# Patient Record
Sex: MALE | Race: WHITE | URBAN - METROPOLITAN AREA
[De-identification: names, ages, dates, MRNs, and addresses within clinical notes are randomized per-mention and may not be internally consistent; named-entity substitution may affect disease eponyms.]

---

## 2019-06-04 ENCOUNTER — HOSPITAL ENCOUNTER (OUTPATIENT)
Dept: CT IMAGING | Facility: CLINIC | Age: 54
Discharge: HOME | End: 2019-06-04
Attending: COLON & RECTAL SURGERY | Admitting: COLON & RECTAL SURGERY
Payer: COMMERCIAL

## 2019-06-04 DIAGNOSIS — K51.90 ULCERATIVE COLITIS WITHOUT COMPLICATIONS (H): ICD-10-CM

## 2019-06-04 DIAGNOSIS — K61.4 INTRASPHINCTERIC ABSCESS: ICD-10-CM

## 2019-06-04 PROCEDURE — 25000125 ZZHC RX 250: Performed by: COLON & RECTAL SURGERY

## 2019-06-04 PROCEDURE — 25000128 H RX IP 250 OP 636: Performed by: COLON & RECTAL SURGERY

## 2019-06-04 PROCEDURE — 72193 CT PELVIS W/DYE: CPT

## 2019-06-04 RX ORDER — BALSALAZIDE DISODIUM 750 MG/1
2250 CAPSULE ORAL 3 TIMES DAILY
COMMUNITY

## 2019-06-04 RX ORDER — FINASTERIDE 1 MG/1
1 TABLET, FILM COATED ORAL DAILY
COMMUNITY

## 2019-06-04 RX ORDER — IOPAMIDOL 755 MG/ML
106 INJECTION, SOLUTION INTRAVASCULAR ONCE
Status: COMPLETED | OUTPATIENT
Start: 2019-06-04 | End: 2019-06-04

## 2019-06-04 RX ADMIN — IOPAMIDOL 106 ML: 755 INJECTION, SOLUTION INTRAVENOUS at 15:20

## 2019-06-04 RX ADMIN — SODIUM CHLORIDE 70 ML: 9 INJECTION, SOLUTION INTRAVENOUS at 15:26

## 2019-06-05 ENCOUNTER — ANESTHESIA EVENT (OUTPATIENT)
Dept: SURGERY | Facility: CLINIC | Age: 54
End: 2019-06-05
Payer: COMMERCIAL

## 2019-06-05 ENCOUNTER — ANESTHESIA (OUTPATIENT)
Dept: SURGERY | Facility: CLINIC | Age: 54
End: 2019-06-05
Payer: COMMERCIAL

## 2019-06-05 ENCOUNTER — HOSPITAL ENCOUNTER (OUTPATIENT)
Facility: CLINIC | Age: 54
Discharge: HOME | End: 2019-06-05
Attending: COLON & RECTAL SURGERY | Admitting: COLON & RECTAL SURGERY
Payer: COMMERCIAL

## 2019-06-05 VITALS
DIASTOLIC BLOOD PRESSURE: 72 MMHG | HEIGHT: 76 IN | WEIGHT: 203 LBS | RESPIRATION RATE: 16 BRPM | HEART RATE: 50 BPM | OXYGEN SATURATION: 96 % | TEMPERATURE: 96.4 F | BODY MASS INDEX: 24.72 KG/M2 | SYSTOLIC BLOOD PRESSURE: 112 MMHG

## 2019-06-05 DIAGNOSIS — K61.0 PERIANAL ABSCESS: Primary | ICD-10-CM

## 2019-06-05 PROCEDURE — 40000170 ZZH STATISTIC PRE-PROCEDURE ASSESSMENT II: Performed by: COLON & RECTAL SURGERY

## 2019-06-05 PROCEDURE — 37000009 ZZH ANESTHESIA TECHNICAL FEE, EACH ADDTL 15 MIN: Performed by: COLON & RECTAL SURGERY

## 2019-06-05 PROCEDURE — 25000128 H RX IP 250 OP 636: Performed by: ANESTHESIOLOGY

## 2019-06-05 PROCEDURE — 25800030 ZZH RX IP 258 OP 636: Performed by: ANESTHESIOLOGY

## 2019-06-05 PROCEDURE — 37000008 ZZH ANESTHESIA TECHNICAL FEE, 1ST 30 MIN: Performed by: COLON & RECTAL SURGERY

## 2019-06-05 PROCEDURE — 36000052 ZZH SURGERY LEVEL 2 EA 15 ADDTL MIN: Performed by: COLON & RECTAL SURGERY

## 2019-06-05 PROCEDURE — 25000125 ZZHC RX 250: Performed by: NURSE ANESTHETIST, CERTIFIED REGISTERED

## 2019-06-05 PROCEDURE — 36000050 ZZH SURGERY LEVEL 2 1ST 30 MIN: Performed by: COLON & RECTAL SURGERY

## 2019-06-05 PROCEDURE — 25800030 ZZH RX IP 258 OP 636: Performed by: NURSE ANESTHETIST, CERTIFIED REGISTERED

## 2019-06-05 PROCEDURE — 25000132 ZZH RX MED GY IP 250 OP 250 PS 637: Performed by: COLON & RECTAL SURGERY

## 2019-06-05 PROCEDURE — 71000012 ZZH RECOVERY PHASE 1 LEVEL 1 FIRST HR: Performed by: COLON & RECTAL SURGERY

## 2019-06-05 PROCEDURE — 27210794 ZZH OR GENERAL SUPPLY STERILE: Performed by: COLON & RECTAL SURGERY

## 2019-06-05 PROCEDURE — 25000128 H RX IP 250 OP 636: Performed by: NURSE ANESTHETIST, CERTIFIED REGISTERED

## 2019-06-05 PROCEDURE — 25000125 ZZHC RX 250: Performed by: COLON & RECTAL SURGERY

## 2019-06-05 PROCEDURE — 71000027 ZZH RECOVERY PHASE 2 EACH 15 MINS: Performed by: COLON & RECTAL SURGERY

## 2019-06-05 PROCEDURE — 25000128 H RX IP 250 OP 636: Performed by: COLON & RECTAL SURGERY

## 2019-06-05 RX ORDER — FENTANYL CITRATE 50 UG/ML
25-50 INJECTION, SOLUTION INTRAMUSCULAR; INTRAVENOUS
Status: DISCONTINUED | OUTPATIENT
Start: 2019-06-05 | End: 2019-06-05 | Stop reason: HOSPADM

## 2019-06-05 RX ORDER — ACETAMINOPHEN 325 MG/1
975 TABLET ORAL ONCE
Status: COMPLETED | OUTPATIENT
Start: 2019-06-05 | End: 2019-06-05

## 2019-06-05 RX ORDER — DEXAMETHASONE SODIUM PHOSPHATE 4 MG/ML
4 INJECTION, SOLUTION INTRA-ARTICULAR; INTRALESIONAL; INTRAMUSCULAR; INTRAVENOUS; SOFT TISSUE EVERY 10 MIN PRN
Status: DISCONTINUED | OUTPATIENT
Start: 2019-06-05 | End: 2019-06-05 | Stop reason: HOSPADM

## 2019-06-05 RX ORDER — MAGNESIUM HYDROXIDE 1200 MG/15ML
LIQUID ORAL PRN
Status: DISCONTINUED | OUTPATIENT
Start: 2019-06-05 | End: 2019-06-05 | Stop reason: HOSPADM

## 2019-06-05 RX ORDER — ONDANSETRON 2 MG/ML
4 INJECTION INTRAMUSCULAR; INTRAVENOUS ONCE
Status: COMPLETED | OUTPATIENT
Start: 2019-06-05 | End: 2019-06-05

## 2019-06-05 RX ORDER — NALOXONE HYDROCHLORIDE 0.4 MG/ML
.1-.4 INJECTION, SOLUTION INTRAMUSCULAR; INTRAVENOUS; SUBCUTANEOUS
Status: DISCONTINUED | OUTPATIENT
Start: 2019-06-05 | End: 2019-06-05 | Stop reason: HOSPADM

## 2019-06-05 RX ORDER — MEPERIDINE HYDROCHLORIDE 25 MG/ML
12.5 INJECTION INTRAMUSCULAR; INTRAVENOUS; SUBCUTANEOUS
Status: DISCONTINUED | OUTPATIENT
Start: 2019-06-05 | End: 2019-06-05 | Stop reason: HOSPADM

## 2019-06-05 RX ORDER — ONDANSETRON 4 MG/1
4 TABLET, ORALLY DISINTEGRATING ORAL
Status: DISCONTINUED | OUTPATIENT
Start: 2019-06-05 | End: 2019-06-05 | Stop reason: HOSPADM

## 2019-06-05 RX ORDER — HYDRALAZINE HYDROCHLORIDE 20 MG/ML
2.5-5 INJECTION INTRAMUSCULAR; INTRAVENOUS EVERY 10 MIN PRN
Status: DISCONTINUED | OUTPATIENT
Start: 2019-06-05 | End: 2019-06-05 | Stop reason: HOSPADM

## 2019-06-05 RX ORDER — PROPOFOL 10 MG/ML
INJECTION, EMULSION INTRAVENOUS PRN
Status: DISCONTINUED | OUTPATIENT
Start: 2019-06-05 | End: 2019-06-05

## 2019-06-05 RX ORDER — SODIUM CHLORIDE, SODIUM LACTATE, POTASSIUM CHLORIDE, CALCIUM CHLORIDE 600; 310; 30; 20 MG/100ML; MG/100ML; MG/100ML; MG/100ML
INJECTION, SOLUTION INTRAVENOUS CONTINUOUS
Status: DISCONTINUED | OUTPATIENT
Start: 2019-06-05 | End: 2019-06-05 | Stop reason: HOSPADM

## 2019-06-05 RX ORDER — PROPOFOL 10 MG/ML
INJECTION, EMULSION INTRAVENOUS CONTINUOUS PRN
Status: DISCONTINUED | OUTPATIENT
Start: 2019-06-05 | End: 2019-06-05

## 2019-06-05 RX ORDER — HYDROMORPHONE HYDROCHLORIDE 1 MG/ML
.3-.5 INJECTION, SOLUTION INTRAMUSCULAR; INTRAVENOUS; SUBCUTANEOUS EVERY 10 MIN PRN
Status: DISCONTINUED | OUTPATIENT
Start: 2019-06-05 | End: 2019-06-05 | Stop reason: HOSPADM

## 2019-06-05 RX ORDER — FENTANYL CITRATE 50 UG/ML
INJECTION, SOLUTION INTRAMUSCULAR; INTRAVENOUS PRN
Status: DISCONTINUED | OUTPATIENT
Start: 2019-06-05 | End: 2019-06-05

## 2019-06-05 RX ORDER — ONDANSETRON 4 MG/1
4 TABLET, ORALLY DISINTEGRATING ORAL EVERY 30 MIN PRN
Status: DISCONTINUED | OUTPATIENT
Start: 2019-06-05 | End: 2019-06-05 | Stop reason: HOSPADM

## 2019-06-05 RX ORDER — LABETALOL HYDROCHLORIDE 5 MG/ML
10 INJECTION, SOLUTION INTRAVENOUS
Status: DISCONTINUED | OUTPATIENT
Start: 2019-06-05 | End: 2019-06-05 | Stop reason: HOSPADM

## 2019-06-05 RX ORDER — OXYCODONE HYDROCHLORIDE 5 MG/1
5-10 TABLET ORAL
Qty: 10 TABLET | Refills: 0 | Status: SHIPPED | OUTPATIENT
Start: 2019-06-05

## 2019-06-05 RX ORDER — ONDANSETRON 2 MG/ML
INJECTION INTRAMUSCULAR; INTRAVENOUS PRN
Status: DISCONTINUED | OUTPATIENT
Start: 2019-06-05 | End: 2019-06-05

## 2019-06-05 RX ORDER — ONDANSETRON 2 MG/ML
4 INJECTION INTRAMUSCULAR; INTRAVENOUS EVERY 30 MIN PRN
Status: DISCONTINUED | OUTPATIENT
Start: 2019-06-05 | End: 2019-06-05 | Stop reason: HOSPADM

## 2019-06-05 RX ORDER — LABETALOL 20 MG/4 ML (5 MG/ML) INTRAVENOUS SYRINGE
10
Status: DISCONTINUED | OUTPATIENT
Start: 2019-06-05 | End: 2019-06-05 | Stop reason: HOSPADM

## 2019-06-05 RX ADMIN — PROPOFOL 100 MCG/KG/MIN: 10 INJECTION, EMULSION INTRAVENOUS at 13:22

## 2019-06-05 RX ADMIN — ACETAMINOPHEN 975 MG: 325 TABLET, FILM COATED ORAL at 11:45

## 2019-06-05 RX ADMIN — PROPOFOL 20 MG: 10 INJECTION, EMULSION INTRAVENOUS at 13:37

## 2019-06-05 RX ADMIN — ONDANSETRON 4 MG: 2 INJECTION INTRAMUSCULAR; INTRAVENOUS at 13:39

## 2019-06-05 RX ADMIN — MIDAZOLAM 2 MG: 1 INJECTION INTRAMUSCULAR; INTRAVENOUS at 13:27

## 2019-06-05 RX ADMIN — DEXMEDETOMIDINE HYDROCHLORIDE 12 MCG: 100 INJECTION, SOLUTION INTRAVENOUS at 13:31

## 2019-06-05 RX ADMIN — FENTANYL CITRATE 50 MCG: 50 INJECTION, SOLUTION INTRAMUSCULAR; INTRAVENOUS at 14:49

## 2019-06-05 RX ADMIN — PROPOFOL 10 MG: 10 INJECTION, EMULSION INTRAVENOUS at 13:49

## 2019-06-05 RX ADMIN — FENTANYL CITRATE 50 MCG: 50 INJECTION, SOLUTION INTRAMUSCULAR; INTRAVENOUS at 15:17

## 2019-06-05 RX ADMIN — PROPOFOL 10 MG: 10 INJECTION, EMULSION INTRAVENOUS at 13:50

## 2019-06-05 RX ADMIN — HYDROMORPHONE HYDROCHLORIDE 0.5 MG: 10 INJECTION, SOLUTION INTRAMUSCULAR; INTRAVENOUS; SUBCUTANEOUS at 15:39

## 2019-06-05 RX ADMIN — PROPOFOL 20 MG: 10 INJECTION, EMULSION INTRAVENOUS at 13:44

## 2019-06-05 RX ADMIN — FENTANYL CITRATE 100 MCG: 50 INJECTION, SOLUTION INTRAMUSCULAR; INTRAVENOUS at 13:27

## 2019-06-05 RX ADMIN — DEXMEDETOMIDINE HYDROCHLORIDE 8 MCG: 100 INJECTION, SOLUTION INTRAVENOUS at 13:39

## 2019-06-05 RX ADMIN — PROPOFOL 30 MG: 10 INJECTION, EMULSION INTRAVENOUS at 13:30

## 2019-06-05 RX ADMIN — SODIUM CHLORIDE, POTASSIUM CHLORIDE, SODIUM LACTATE AND CALCIUM CHLORIDE: 600; 310; 30; 20 INJECTION, SOLUTION INTRAVENOUS at 13:22

## 2019-06-05 RX ADMIN — ONDANSETRON 4 MG: 2 INJECTION INTRAMUSCULAR; INTRAVENOUS at 16:45

## 2019-06-05 RX ADMIN — PROPOFOL 10 MG: 10 INJECTION, EMULSION INTRAVENOUS at 13:40

## 2019-06-05 ASSESSMENT — MIFFLIN-ST. JEOR: SCORE: 1872.3

## 2019-06-05 NOTE — ANESTHESIA PREPROCEDURE EVALUATION
"Anesthesia Pre-Procedure Evaluation    Patient: Spencer Sibley   MRN: [id number] : 1965          Preoperative Diagnosis: PERIANAL ABCESS    Procedure(s):  INCISION AND DRAINAGE OF PERIANAL ABCESS    Past Medical History:   Diagnosis Date     Intersphincteric abscess      Ulcerative colitis without complications (H)      Past Surgical History:   Procedure Laterality Date     COLONOSCOPY         Anesthesia Evaluation     . Pt has had prior anesthetic.     No history of anesthetic complications          ROS/MED HX    ENT/Pulmonary:  - neg pulmonary ROS    (-) sleep apnea   Neurologic:  - neg neurologic ROS     Cardiovascular:  - neg cardiovascular ROS      (-) hypertension   METS/Exercise Tolerance:     Hematologic:         Musculoskeletal:         GI/Hepatic:     (+) Other GI/Hepatic ulcerative colitis     (-) GERD   Renal/Genitourinary:  - ROS Renal section negative       Endo:  - neg endo ROS       Psychiatric:         Infectious Disease:         Malignancy:         Other:                          Physical Exam  Normal systems: cardiovascular, pulmonary and dental    Airway   Mallampati: I  TM distance: >3 FB  Neck ROM: full    Dental     Cardiovascular       Pulmonary             No results found for: WBC, HGB, HCT, PLT, CRP, SED, NA, POTASSIUM, CHLORIDE, CO2, BUN, CR, GLC, GLENROY, PHOS, MAG, ALBUMIN, PROTTOTAL, ALT, AST, GGT, ALKPHOS, BILITOTAL, BILIDIRECT, LIPASE, AMYLASE, LOW, PTT, INR, FIBR, TSH, T4, T3, HCG, HCGS, CKTOTAL, CKMB, TROPN    Preop Vitals  BP Readings from Last 3 Encounters:   07 108/72    Pulse Readings from Last 3 Encounters:   07 72      Resp Readings from Last 3 Encounters:   No data found for Resp    SpO2 Readings from Last 3 Encounters:   No data found for SpO2      Temp Readings from Last 1 Encounters:   07 36.3  C (97.4  F) (Oral)    Ht Readings from Last 1 Encounters:   07 1.93 m (6' 4\")      Wt Readings from Last 1 Encounters:   07 98.1 kg (216 lb 4 " "oz)    Estimated body mass index is 26.32 kg/m  as calculated from the following:    Height as of 5/3/07: 1.93 m (6' 4\").    Weight as of 5/3/07: 98.1 kg (216 lb 4 oz).       Anesthesia Plan      History & Physical Review  History and physical reviewed and following examination; no interval change.    ASA Status:  2 .    NPO Status:  > 8 hours    Plan for MAC Reason for MAC:  Deep or markedly invasive procedure (G8)  PONV prophylaxis:  Ondansetron (or other 5HT-3)       Postoperative Care  Postoperative pain management:  Multi-modal analgesia.      Consents  Anesthetic plan, risks, benefits and alternatives discussed with:  Patient..                 Gisell Beckford MD  "

## 2019-06-05 NOTE — DISCHARGE INSTRUCTIONS

## 2019-06-05 NOTE — OP NOTE
OPERATIVE REPORT    PREOPERATIVE DIAGNOSIS: Perianal abscess     POSTOPERATIVE DIAGNOSIS: Right posterior intersphincteric abscess    OPERATION PERFORMED:   1. Exam under anesthesia  2. Internal drainage of abscess with drain placement    SURGEON: Renetta Recio M.D.   ASSISTANT: Jesusita Thrasher PA-C    ANESTHESIA: General.     INDICATIONS: Spencer Sibley is a 53 year old male with ulcerative colitis who developed rectal pain ~ 1 week ago.  He was seen in clinic yesterday and on exam noted to have a painful bulge in the right posterolateral location on digital rectal exam.  A CT scan was obtained an demonstrated a perirectal abscess.  The risks of the procedure including bleeding, infection, and incontinence were discussed with the patient and he agrees to proceed.      OPERATIVE FINDINGS: There was no external swelling or erythema.  Right posterolateral bulge noted on anoscopy with small pinhole area with purulent drainage.   Approximately 10 cc of thick purulent fluid was drained from this abscess cavity. There was no obvious fistula communication on exam. The distal anorectal mucosa appears normal and healthy.     PROCEDURE IN DETAIL: After informed consent was obtained the patient was brought to the operating room. SCD's were placed and bilateral lower extremities, and MAC anesthesia was induced without difficulty.  He was flipped into the well-padded prone jackknife position with  buttocks taped apart. The perianal region was sterilely prepped and draped in usual fashion. An anal field block consisting of  40 mL of 1% lidocaine with epinephrine and 0.5% Marcaine was instilled into the 4 quadrants of the anal canal.     A Roberts bivalve retractor was inserted into the anal canal, and the operative findings are noted above. There was a bulge in the rectal mucosa in the right posterolateral position.  On digital exam this was noted to be above the anorectal ring.  There was a small opening in the rectal mucosa with  purulent drainage at this site.  A 15 blade scalpel was used to make an incision in the rectal mucosa at this opening.  There was immediate return of purulent fluid, ~ 10 mL total.  A ady clamp was used to enlarge the opening and the cavity was irrigated with sterile saline.  The abscess cavity measured ~ 3 x 2 cm.  A 14 Fr Malecot drain was placed into the cavity and the distal end was sutured to the right medial buttock with 2-0 silk.  The distal anorectal mucosa appeared normal and there was no evidence of an internal fistula opening.     Sterile guaze dressing was placed.The patient tolerated the procedure well without complications. Estimated blood loss was minimal. I was present and scrubbed for the entire duration of the operation. The patient was returned to the supine position, extubated and brought to the recovery room in stable condition.    Renetta Recio MD

## 2019-06-05 NOTE — BRIEF OP NOTE
[facility]    Brief Operative Note    Pre-operative diagnosis: PERIANAL ABCESS  Post-operative diagnosis Perianal abscess  Procedure: Procedure(s):  EXAM UNDER ANESTHESIA, INCISION AND DRAINAGE OF PERIANAL ABCESS  Surgeon: Surgeon(s) and Role:     * Evy Recio MD - Primary  Anesthesia: Monitor Anesthesia Care   Estimated blood loss: Minimal  Drains: 14 Irish malecot drain   Specimens: * No specimens in log *  Findings:   Internal drainage of right posterior perianal abscess.  Complications: None.  Implants:  * No implants in log *     Jesusita Thrasher PA-C on 6/5/2019 at 2:09 PM

## 2019-06-05 NOTE — ANESTHESIA CARE TRANSFER NOTE
Patient: Spencer Sibley    Procedure(s):  EXAM UNDER ANESTHESIA, INCISION AND DRAINAGE OF PERIANAL ABCESS    Diagnosis: PERIANAL ABCESS  Diagnosis Additional Information: No value filed.    Anesthesia Type:   MAC     Note:  Airway :Nasal Cannula  Patient transferred to:PACU  Comments: At end of procedure, spontaneous respirations, patient alert to voice, able to follow commands. Oxygen via nasal cannula at 3 liters per minute to PACU. Oxygen tubing connected to wall O2 in PACU, SpO2, NiBP, and EKG monitors and alarms on and functioning, Ingris Hugger warmer connected to patient gown, report on patient's clinical status given to PACU RN, RN questions answered.Handoff Report: Identifed the Patient, Identified the Reponsible Provider, Reviewed the pertinent medical history, Discussed the surgical course, Reviewed Intra-OP anesthesia mangement and issues during anesthesia, Set expectations for post-procedure period and Allowed opportunity for questions and acknowledgement of understanding      Vitals: (Last set prior to Anesthesia Care Transfer)    CRNA VITALS  6/5/2019 1331 - 6/5/2019 1408      6/5/2019             NIBP:  110/60    NIBP Mean:  78                Electronically Signed By: CHICO Duran CRNA  June 5, 2019  2:08 PM

## 2019-06-05 NOTE — OR NURSING
Pt able to void, IV d/c'd, pt rates pain at 0/10. Dressing to perianal area changed by patient. Transported to door 1 to go home.

## 2019-06-05 NOTE — OR NURSING
"Discharge instructions reviewed with wife, Adriana. Adriana verbalized understanding to RN. Pt taking PO, rates pain at 1/10 stating \"mostly discomfort\". Pt remains in Phase II w/wife waiting to void prior to discharge.  "

## 2019-06-05 NOTE — ANESTHESIA POSTPROCEDURE EVALUATION
Patient: Spencer Sibley    Procedure(s):  EXAM UNDER ANESTHESIA, INCISION AND DRAINAGE OF PERIANAL ABCESS    Diagnosis:PERIANAL ABCESS  Diagnosis Additional Information: No value filed.    Anesthesia Type:  MAC    Note:  Anesthesia Post Evaluation    Patient location during evaluation: PACU  Patient participation: Able to fully participate in evaluation  Level of consciousness: awake and alert  Pain management: adequate  Airway patency: patent  Cardiovascular status: acceptable  Respiratory status: acceptable and unassisted  Hydration status: acceptable  PONV: none             Last vitals:  Vitals:    06/05/19 1404 06/05/19 1415 06/05/19 1430   BP: 104/74 114/73 115/66   Pulse: 69 67 64   Resp: 16 16 21   Temp:  36  C (96.8  F) 35.8  C (96.4  F)   SpO2: 99% 99% 97%         Electronically Signed By: Gisell Beckford MD  June 5, 2019  3:00 PM

## (undated) DEVICE — GOWN IMPERVIOUS ZONED LG

## (undated) DEVICE — PACK MINOR SBA15MIFSE

## (undated) DEVICE — DRSG KERLIX FLUFFS X5

## (undated) DEVICE — DRAPE MINOR PROCEDURE LAP 29496

## (undated) DEVICE — SUCTION TIP YANKAUER W/O VENT K86

## (undated) DEVICE — GLOVE PROTEXIS W/NEU-THERA 6.5  2D73TE65

## (undated) DEVICE — SPONGE BALL KERLIX ROUND XL W/O STRING LATEX 4935

## (undated) DEVICE — SUCTION CANISTER MEDIVAC LINER 3000ML W/LID 65651-530

## (undated) DEVICE — NDL 27GA 1.25" 305136

## (undated) DEVICE — LINEN TOWEL PACK X5 5464

## (undated) DEVICE — SU SILK 0 SH 30" K834H

## (undated) DEVICE — SOL WATER IRRIG 1000ML BOTTLE 2F7114

## (undated) DEVICE — SYR EAR BULB 3OZ 0035830

## (undated) DEVICE — SOL NACL 0.9% IRRIG 1000ML BOTTLE 2F7124

## (undated) DEVICE — ESU ELEC NDL 1" COATED/INSULATED E1465

## (undated) DEVICE — PANTIES MESH LG/XLG 2PK 706M2

## (undated) DEVICE — DRSG ABDOMINAL 07 1/2X8" 7197D

## (undated) DEVICE — GLOVE PROTEXIS BLUE W/NEU-THERA 6.5  2D73EB65

## (undated) DEVICE — CATH MALECOT 14FR LATEX 086014

## (undated) RX ORDER — ONDANSETRON 2 MG/ML
INJECTION INTRAMUSCULAR; INTRAVENOUS
Status: DISPENSED
Start: 2019-06-05

## (undated) RX ORDER — FENTANYL CITRATE 50 UG/ML
INJECTION, SOLUTION INTRAMUSCULAR; INTRAVENOUS
Status: DISPENSED
Start: 2019-06-05

## (undated) RX ORDER — HYDROMORPHONE HYDROCHLORIDE 1 MG/ML
INJECTION, SOLUTION INTRAMUSCULAR; INTRAVENOUS; SUBCUTANEOUS
Status: DISPENSED
Start: 2019-06-05

## (undated) RX ORDER — BUPIVACAINE HYDROCHLORIDE 5 MG/ML
INJECTION, SOLUTION EPIDURAL; INTRACAUDAL
Status: DISPENSED
Start: 2019-06-05

## (undated) RX ORDER — ACETAMINOPHEN 160 MG
TABLET,DISINTEGRATING ORAL
Status: DISPENSED
Start: 2019-06-05

## (undated) RX ORDER — PROPOFOL 10 MG/ML
INJECTION, EMULSION INTRAVENOUS
Status: DISPENSED
Start: 2019-06-05

## (undated) RX ORDER — ACETAMINOPHEN 325 MG/1
TABLET ORAL
Status: DISPENSED
Start: 2019-06-05